# Patient Record
Sex: FEMALE
[De-identification: names, ages, dates, MRNs, and addresses within clinical notes are randomized per-mention and may not be internally consistent; named-entity substitution may affect disease eponyms.]

---

## 2021-01-26 PROBLEM — Z00.00 ENCOUNTER FOR PREVENTIVE HEALTH EXAMINATION: Status: ACTIVE | Noted: 2021-01-26

## 2021-02-03 ENCOUNTER — RESULT CHARGE (OUTPATIENT)
Age: 61
End: 2021-02-03

## 2021-02-03 ENCOUNTER — APPOINTMENT (OUTPATIENT)
Age: 61
End: 2021-02-03
Payer: COMMERCIAL

## 2021-02-03 VITALS — BODY MASS INDEX: 20.89 KG/M2 | HEIGHT: 66 IN | WEIGHT: 130 LBS

## 2021-02-03 DIAGNOSIS — Z83.6 FAMILY HISTORY OF OTHER DISEASES OF THE RESPIRATORY SYSTEM: ICD-10-CM

## 2021-02-03 DIAGNOSIS — Z78.9 OTHER SPECIFIED HEALTH STATUS: ICD-10-CM

## 2021-02-03 DIAGNOSIS — K59.00 CONSTIPATION, UNSPECIFIED: ICD-10-CM

## 2021-02-03 DIAGNOSIS — R32 UNSPECIFIED URINARY INCONTINENCE: ICD-10-CM

## 2021-02-03 DIAGNOSIS — R33.9 RETENTION OF URINE, UNSPECIFIED: ICD-10-CM

## 2021-02-03 DIAGNOSIS — F32.9 MAJOR DEPRESSIVE DISORDER, SINGLE EPISODE, UNSPECIFIED: ICD-10-CM

## 2021-02-03 DIAGNOSIS — R30.0 DYSURIA: ICD-10-CM

## 2021-02-03 DIAGNOSIS — J45.909 UNSPECIFIED ASTHMA, UNCOMPLICATED: ICD-10-CM

## 2021-02-03 LAB
BILIRUB UR QL STRIP: NEGATIVE
CLARITY UR: CLEAR
COLLECTION METHOD: NORMAL
GLUCOSE UR-MCNC: NEGATIVE
HCG UR QL: 0.2 EU/DL
HGB UR QL STRIP.AUTO: NEGATIVE
KETONES UR-MCNC: NEGATIVE
LEUKOCYTE ESTERASE UR QL STRIP: NORMAL
NITRITE UR QL STRIP: NEGATIVE
PH UR STRIP: 6
PROT UR STRIP-MCNC: NEGATIVE
SP GR UR STRIP: 1.01

## 2021-02-03 PROCEDURE — 51701 INSERT BLADDER CATHETER: CPT

## 2021-02-03 PROCEDURE — 99072 ADDL SUPL MATRL&STAF TM PHE: CPT

## 2021-02-03 PROCEDURE — 99205 OFFICE O/P NEW HI 60 MIN: CPT | Mod: 25

## 2021-02-03 NOTE — OB HISTORY
[Vaginal ___] : [unfilled] vaginal delivery(s) [Definite ___ (Date)] : the last menstrual period was [unfilled] [Last Pap Smear ___] : date of last pap smear was on [unfilled] [Taking Estrogens] : taking estrogen replacement [Abnormal Pap Smear] : normal pap smear [Abnormal Bleeding] : without bleeding [Sexually Active] : is not sexually active

## 2021-02-03 NOTE — PHYSICAL EXAM
[Chaperone Present] : A chaperone was present in the examining room during all aspects of the physical examination [No Acute Distress] : in no acute distress [Well developed] : well developed [Well Nourished] : ~L well nourished [Oriented x3] : oriented to person, place, and time [Normal Memory] : ~T memory was ~L unimpaired [Normal Mood/Affect] : mood and affect are normal [No Edema] : ~T edema was not present [None] : no CVA tenderness [Normal Gait] : gait was normal [Vulvar Atrophy] : vulvar atrophy [Labia Majora] : were normal [Atrophy] : atrophy [1] : 1 [Soft] :  the cervix was soft [Normal] : no abnormalities [Post Void Residual ____ml] : post void residual was [unfilled] ml [Cough] : no cough [Tenderness] : ~T no ~M abdominal tenderness observed [Distended] : not distended [Inguinal LAD] : no adenopathy was noted in the inguinal lymph nodes [FreeTextEntry3] : neg CST  [de-identified] : no prolapse

## 2021-02-03 NOTE — HISTORY OF PRESENT ILLNESS
[Cystocele (Obstetric)] : no [Vaginal Wall Prolapse] : no [Rectal Prolapse] : no [Urge Incontinence Of Urine] : no [x2] : nocturia two times a night [Feelings Of Urinary Urgency] : no [Urinary Frequency] : no [Pain During Urination (Dysuria)] : no [Urinary Tract Infection] : no [Unable To Restrain Bowel Movement] : mild [FreeTextEntry1] :  \alexus Emmanuel presents with vaginal dryness, she did have a UTI and went to an urgent care 1 yr ago, and from then on she has feelings of UTI she reports occasionally the cultures are negative, sometimes they are positive, she was seen by a urologist and was given a prescription for a vaginal cream, estrace and she started using it about 3 wks ago, she reports that she has not been able to see urology, she reports rare leakage with laughing/coughing/sneezing does wear a thin pad and reports that it is uncomfortable, she denies urinary frequency, nocturia X 2, possible UUI , does have dysuria, she is unsure if she is emptying, she denies hematuria, denies pelvic pain, denies vaginal bulge, denies vaginal bleeding unless she inserts something, denies leakage of stool,  she is not currently sexually active,  she reports the feeling of constant urgency/dysuria is better now but then it comes back, she just had 1 positive cx after GYN exam, she does report urgency/frequency is what tends to bother her the most,

## 2021-02-03 NOTE — LETTER BODY
[Dear  ___] : Dear  [unfilled], [Dear  ___] : Dear ~DEVAN, [Attached please find my note.] : Attached please find my note. [Thank you very much for allowing me to participate in the care of this patient. If you have any questions, please do not hesitate to contact me] : Thank you very much for allowing me to participate in the care of this patient. If you have any questions, please do not hesitate to contact me.

## 2021-02-03 NOTE — DISCUSSION/SUMMARY
[FreeTextEntry1] : \par Lien presents with atrophy and possible OAB. On exam atrophic vagina, no prolapse, normal PVR. We reviewed her symptoms and exam findings. We discussed management options for overactive bladder including observation, behavorial modifications and bladder training, physical therapy and medications including anticholinergics and beta 3 agonists. We discussed if behavorial modifications and medications fail proceeding with urodynamics to further evaluate her symptoms. We discussed additional treatment options including sacral neuromodulation, PTNS and intra detrusor Botox. IU[facility] handout on overactive bladder and bladder training was given to her. We discussed pyridium PRN. We discussed using estrace cream as well as vulvar care with vit E. She will return in 2 months or sooner as needed. All questions were answered.\par \par [] u/a, culture\par [] BT/Pyridium PRN\par [] estrace\par [] return in 2 months

## 2021-02-04 LAB
APPEARANCE: CLEAR
BACTERIA: NEGATIVE
BILIRUBIN URINE: NEGATIVE
BLOOD URINE: NEGATIVE
COLOR: NORMAL
GLUCOSE QUALITATIVE U: NEGATIVE
HYALINE CASTS: 0 /LPF
KETONES URINE: NEGATIVE
LEUKOCYTE ESTERASE URINE: NEGATIVE
MICROSCOPIC-UA: NORMAL
NITRITE URINE: NEGATIVE
PH URINE: 6
PROTEIN URINE: NEGATIVE
RED BLOOD CELLS URINE: 0 /HPF
SPECIFIC GRAVITY URINE: 1.01
SQUAMOUS EPITHELIAL CELLS: 0 /HPF
UROBILINOGEN URINE: NORMAL
WHITE BLOOD CELLS URINE: 0 /HPF

## 2021-02-05 LAB — BACTERIA UR CULT: NORMAL

## 2021-04-08 ENCOUNTER — APPOINTMENT (OUTPATIENT)
Dept: UROGYNECOLOGY | Facility: CLINIC | Age: 61
End: 2021-04-08
Payer: COMMERCIAL

## 2021-04-08 ENCOUNTER — RESULT CHARGE (OUTPATIENT)
Age: 61
End: 2021-04-08

## 2021-04-08 VITALS — SYSTOLIC BLOOD PRESSURE: 119 MMHG | DIASTOLIC BLOOD PRESSURE: 79 MMHG

## 2021-04-08 DIAGNOSIS — R35.1 NOCTURIA: ICD-10-CM

## 2021-04-08 DIAGNOSIS — N90.5 ATROPHY OF VULVA: ICD-10-CM

## 2021-04-08 LAB
BILIRUB UR QL STRIP: NEGATIVE
CLARITY UR: CLEAR
COLLECTION METHOD: NORMAL
GLUCOSE UR-MCNC: NEGATIVE
HCG UR QL: 0.2 EU/DL
HGB UR QL STRIP.AUTO: NORMAL
KETONES UR-MCNC: NEGATIVE
LEUKOCYTE ESTERASE UR QL STRIP: NORMAL
NITRITE UR QL STRIP: NEGATIVE
PH UR STRIP: 5
PROT UR STRIP-MCNC: NEGATIVE
SP GR UR STRIP: 1.02

## 2021-04-08 PROCEDURE — 99213 OFFICE O/P EST LOW 20 MIN: CPT

## 2021-04-08 PROCEDURE — 99072 ADDL SUPL MATRL&STAF TM PHE: CPT

## 2021-04-08 NOTE — DISCUSSION/SUMMARY
[FreeTextEntry1] : 60yo F presenting for f/u s/p treatment with estrace cream for vaginal dryness., she has complete resolution of her symptoms,she is extremely happy. return in 6 months. \par \par [] RTO 6 months for f/u of symptoms

## 2021-04-08 NOTE — HISTORY OF PRESENT ILLNESS
[Cystocele (Obstetric)] : no [Vaginal Wall Prolapse] : no [Rectal Prolapse] : no [Urge Incontinence Of Urine] : no [Unable To Restrain Bowel Movement] : no [x1] : nocturia once nightly [Urinary Frequency] : no [Feelings Of Urinary Urgency] : no [Pain During Urination (Dysuria)] : no [Urinary Tract Infection] : no [FreeTextEntry1] : 62yo F presenting for f/u s/p treatment with estrace cream for vaginal dryness. Reports dryness resolved and urinary symptoms subsided. Rare leakage with cough/laugh/sneeze. Denies dysuria, burning with urination, urgency or frequency., no vaginal bleeding

## 2021-10-12 ENCOUNTER — APPOINTMENT (OUTPATIENT)
Dept: UROGYNECOLOGY | Facility: CLINIC | Age: 61
End: 2021-10-12

## 2025-03-04 ENCOUNTER — APPOINTMENT (OUTPATIENT)
Dept: UROGYNECOLOGY | Facility: CLINIC | Age: 65
End: 2025-03-04

## 2025-03-04 NOTE — DISCUSSION/SUMMARY
[FreeTextEntry1] : JONATHAN is a 65 year female who presents for On exam, negative CST, normal PVR, no POP.   [] []   f/u All questions answered.

## 2025-03-04 NOTE — HISTORY OF PRESENT ILLNESS
[FreeTextEntry1] : JONATHAN is a 65 year female who presents for  Last seen by me in 2021 for atrophy and possible OAB. She was doing well with Estrace cream.      Daytime frequency:  Nocturia:  Urinary urgency:  Leakage of urine with urgency:  Leakage of urine with coughing sneezing laughing:  Incontinence pad use:  Sensation of incomplete bladder emptying:  History of frequent urinary tract infections:  History of hematuria:  Previous treatment:  Vaginal symptoms:  Bowel symptoms:      OB:  GYN: LMP, pap, estrogen use PMH:  PSH:  Meds:  Allx:

## 2025-03-04 NOTE — ADDENDUM
[FreeTextEntry1] : This note was written by Karyn Faria, acting as the  for Dr. Richardson. This note accurately reflects the work and decisions made by Dr. Richardson.